# Patient Record
Sex: FEMALE | Race: WHITE | NOT HISPANIC OR LATINO | Employment: STUDENT | URBAN - METROPOLITAN AREA
[De-identification: names, ages, dates, MRNs, and addresses within clinical notes are randomized per-mention and may not be internally consistent; named-entity substitution may affect disease eponyms.]

---
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
## 2019-08-27 ENCOUNTER — TRANSCRIBE ORDERS (OUTPATIENT)
Dept: PHYSICAL THERAPY | Facility: CLINIC | Age: 19
End: 2019-08-27

## 2019-08-27 ENCOUNTER — EVALUATION (OUTPATIENT)
Dept: PHYSICAL THERAPY | Facility: CLINIC | Age: 19
End: 2019-08-27
Payer: COMMERCIAL

## 2019-08-27 DIAGNOSIS — G44.89 OTHER HEADACHE SYNDROME: Primary | ICD-10-CM

## 2019-08-27 DIAGNOSIS — G44.89: Primary | ICD-10-CM

## 2019-08-27 PROCEDURE — 97162 PT EVAL MOD COMPLEX 30 MIN: CPT | Performed by: PHYSICAL MEDICINE & REHABILITATION

## 2019-08-27 NOTE — PROGRESS NOTES
PT Evaluation     Today's date: 2019  Patient name: Mei Arredondo  : 2000  MRN: [id number]  Referring provider: Mira Dawkins  Dx:   Encounter Diagnosis     ICD-10-CM    1  Other headache syndrome G44 89        Start Time: 1545  Stop Time: 1645  Total time in clinic (min): 60 minutes    Assessment  Assessment details: Pt is a 20 y o  female presenting to outpatient physical therapy with neck pain and cervicogenic headaches   Pt presents with pain, decreased range of motion, decreased strength, and decreased tolerance to activity  Pt would benefit from skilled physical therapy to address limitations and to achieve goals  Thank you for this referral    Impairments: abnormal muscle tone, abnormal or restricted ROM, impaired physical strength, lacks appropriate home exercise program, pain with function and poor posture     Symptom irritability: highUnderstanding of Dx/Px/POC: good   Prognosis: good    Goals  ST  Patient will report 25% decrease in pain in 4 weeks  2  Patient will demonstrate 25% improvement in ROM in 4 weeks  3  Patient will demonstrate 1/2 grade improvement in strength in 4 weeks  LT  Patient will be able to perform IADLS without restriction or pain by discharge  2  Patient will be independent in HEP by discharge  3  Patient will be able to return to recreational/work duties without restriction or pain by discharge        Plan  Patient would benefit from: PT eval and skilled physical therapy  Planned modality interventions: biofeedback, cryotherapy, TENS and thermotherapy: hydrocollator packs  Planned therapy interventions: joint mobilization, manual therapy, neuromuscular re-education, patient education, postural training, strengthening, stretching, therapeutic activities, therapeutic exercise, therapeutic training, functional ROM exercises, abdominal trunk stabilization and home exercise program  Frequency: 2x week  Duration in weeks: 4  Treatment plan discussed with: patient        Subjective Evaluation    History of Present Illness  Mechanism of injury: Pt reports history of 3 documented concussions, most recently 19  She was seen by neurology and diagnosed with cervicogenic headaches  She no longer experiences the headache sxs, however she reports neck and upper/mid/lower thoracic pain  She was seen twice by a chiropractor with temporary relief  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[employer]  Pain  Current pain ratin  At best pain ratin  At worst pain ratin  Quality: tight and pressure    Patient Goals  Patient goals for therapy: decreased pain  Patient goal: Ability to maintain upright seated posture  Objective     Static Posture     Head  Forward  Shoulders  Rounded  Scapulae  Left anteriorly tipped and right anteriorly tipped  Thoracic Spine  Hyperkyphosis      Active Range of Motion   Cervical/Thoracic Spine       Cervical    Flexion: 60 degrees   Extension: 65 degrees      Left lateral flexion: 45 degrees      Right lateral flexion: 50 degrees      Left rotation: 65 degrees  Right rotation: 65 degrees         Left Shoulder   Normal active range of motion    Right Shoulder   Normal active range of motion    Additional Active Range of Motion Details  Aberrant motion with flexion, R SB,     Joint Play     Hypomobile: C3, C4, C5, C6, C7, T1, T2, T3, T4, T5, T6, T7, T8, T9, T10, T11 and T12     Strength/Myotome Testing     Left Shoulder     Planes of Motion   Flexion: 4   Extension: 4   Abduction: 4     Right Shoulder     Planes of Motion   Flexion: 4   Extension: 4   Abduction: 4     Tests   Cervical   Positive neck flexor muscle endurance test     Additional Tests Details  Chin tuck and lift: 6 seconds      Flowsheet Rows      Most Recent Value   PT/OT G-Codes   Current Score  59   Projected Score  75             Precautions: Asthma, migraines      Manual              T/S PA (mid/low) Gr V  NC            Lumbar rotational mob Gr V  NC Exercise Diary              UBE             Open books             TB rows             TB ext             Doorway stretch             Prone YTW             Alt UE/LUIS MANUEL hernandez biofeedback                                                                                                                                                                Modalities

## 2019-08-27 NOTE — LETTER
2019    [place] [address]    Patient: Stephany Colindres   YOB: 2000   Date of Visit: 2019     Encounter Diagnosis     ICD-10-CM    1  Other headache syndrome G44 89        Dear Dr Carey Chol:    Thank you for your recent referral of Stephany Colindres  Please review the attached evaluation summary from Tika's recent visit  Please verify that you agree with the plan of care by signing the attached order  If you have any questions or concerns, please do not hesitate to call  I sincerely appreciate the opportunity to share in the care of one of your patients and hope to have another opportunity to work with you in the near future  Sincerely,    Shaggy Leong, PT      Referring Provider:      I certify that I have read the below Plan of Care and certify the need for these services furnished under this plan of treatment while under my care  Augusto [address]: [phone]          PT Evaluation     Today's date: 2019  Patient name: Stephany Colindres  : 2000  MRN: [id number]  Referring provider: Ming Fernandez  Dx:   Encounter Diagnosis     ICD-10-CM    1  Other headache syndrome G44 89        Start Time: 1545  Stop Time: 1645  Total time in clinic (min): 60 minutes    Assessment  Assessment details: Pt is a 20 y o  female presenting to outpatient physical therapy with neck pain and cervicogenic headaches   Pt presents with pain, decreased range of motion, decreased strength, and decreased tolerance to activity  Pt would benefit from skilled physical therapy to address limitations and to achieve goals   Thank you for this referral    Impairments: abnormal muscle tone, abnormal or restricted ROM, impaired physical strength, lacks appropriate home exercise program, pain with function and poor posture     Symptom irritability: highUnderstanding of Dx/Px/POC: good Prognosis: good    Goals  ST  Patient will report 25% decrease in pain in 4 weeks  2  Patient will demonstrate 25% improvement in ROM in 4 weeks  3  Patient will demonstrate 1/2 grade improvement in strength in 4 weeks  LT  Patient will be able to perform IADLS without restriction or pain by discharge  2  Patient will be independent in HEP by discharge  3  Patient will be able to return to recreational/work duties without restriction or pain by discharge  Plan  Patient would benefit from: PT eval and skilled physical therapy  Planned modality interventions: biofeedback, cryotherapy, TENS and thermotherapy: hydrocollator packs  Planned therapy interventions: joint mobilization, manual therapy, neuromuscular re-education, patient education, postural training, strengthening, stretching, therapeutic activities, therapeutic exercise, therapeutic training, functional ROM exercises, abdominal trunk stabilization and home exercise program  Frequency: 2x week  Duration in weeks: 4  Treatment plan discussed with: patient        Subjective Evaluation    History of Present Illness  Mechanism of injury: Pt reports history of 3 documented concussions, most recently 19  She was seen by neurology and diagnosed with cervicogenic headaches  She no longer experiences the headache sxs, however she reports neck and upper/mid/lower thoracic pain  She was seen twice by a chiropractor with temporary relief  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[employer]  Pain  Current pain ratin  At best pain ratin  At worst pain ratin  Quality: tight and pressure    Patient Goals  Patient goals for therapy: decreased pain  Patient goal: Ability to maintain upright seated posture  Objective     Static Posture     Head  Forward  Shoulders  Rounded  Scapulae  Left anteriorly tipped and right anteriorly tipped  Thoracic Spine  Hyperkyphosis      Active Range of Motion   Cervical/Thoracic Spine Cervical    Flexion: 60 degrees   Extension: 65 degrees      Left lateral flexion: 45 degrees      Right lateral flexion: 50 degrees      Left rotation: 65 degrees  Right rotation: 65 degrees         Left Shoulder   Normal active range of motion    Right Shoulder   Normal active range of motion    Additional Active Range of Motion Details  Aberrant motion with flexion, R SB,     Joint Play     Hypomobile: C3, C4, C5, C6, C7, T1, T2, T3, T4, T5, T6, T7, T8, T9, T10, T11 and T12     Strength/Myotome Testing     Left Shoulder     Planes of Motion   Flexion: 4   Extension: 4   Abduction: 4     Right Shoulder     Planes of Motion   Flexion: 4   Extension: 4   Abduction: 4     Tests   Cervical   Positive neck flexor muscle endurance test     Additional Tests Details  Chin tuck and lift: 6 seconds      Flowsheet Rows      Most Recent Value   PT/OT G-Codes   Current Score  59   Projected Score  75             Precautions: Asthma, migraines      Manual  8/27            T/S PA (mid/low) Gr V  NC            Lumbar rotational mob Gr V  NC                                                       Exercise Diary              UBE             Open books             TB rows             TB ext             Doorway stretch             Prone YTW             Alt UE/LE             superman             Chin tucks c biofeedback                                                                                                                                                                Modalities

## 2019-08-29 ENCOUNTER — OFFICE VISIT (OUTPATIENT)
Dept: PHYSICAL THERAPY | Facility: CLINIC | Age: 19
End: 2019-08-29
Payer: COMMERCIAL

## 2019-08-29 DIAGNOSIS — G44.89 OTHER HEADACHE SYNDROME: Primary | ICD-10-CM

## 2019-08-29 PROCEDURE — 97110 THERAPEUTIC EXERCISES: CPT

## 2019-08-29 PROCEDURE — 97140 MANUAL THERAPY 1/> REGIONS: CPT | Performed by: PHYSICAL MEDICINE & REHABILITATION

## 2019-08-29 NOTE — PROGRESS NOTES
Daily Note     Today's date: 2019  Patient name: Jun Banerjee  : 2000  MRN: [id number]  Referring provider: Pippa Adkins, PT  Dx:   Encounter Diagnosis     ICD-10-CM    1  Other headache syndrome G44 89                   Subjective: Pt states that she is feeling better, she has been attempting to get up every 45 minutes with improved sxs  Objective: See treatment diary below     Precautions: Asthma, migraines    Manual             T/S PA (mid/low) Gr V  NC Gr V  NC           Lumbar rotational mob Gr V  NC Gr V   NC                          Exercise Diary              UBE 3'/3'            Open books nv            TB rows GTB 5"x20            TB ext GTB 5"x20            Doorway stretch High and low 30"x3 ea            Prone YTW 5"x15 ea            Alt UE/LE 5"x10 ea            superman 5"x20            Chin tucks c biofeedback 4 diff [id number]"x10 ea            Multifidus press GTB 5"x13 ea                                                       Assessment: Tolerated treatment well  Patient demonstrated fatigue post treatment, exhibited good technique with therapeutic exercises and would benefit from continued PT to address remaining strength and stability deficits to improve posture and reduce HA frequency  Plan: Continue per plan of care  Progress treatment as tolerated      Mario Núñez, PTA

## 2019-09-03 ENCOUNTER — OFFICE VISIT (OUTPATIENT)
Dept: PHYSICAL THERAPY | Facility: CLINIC | Age: 19
End: 2019-09-03
Payer: COMMERCIAL

## 2019-09-03 DIAGNOSIS — G44.89 OTHER HEADACHE SYNDROME: Primary | ICD-10-CM

## 2019-09-03 PROCEDURE — 97110 THERAPEUTIC EXERCISES: CPT

## 2019-09-03 PROCEDURE — 97140 MANUAL THERAPY 1/> REGIONS: CPT | Performed by: PHYSICAL THERAPIST

## 2019-09-03 NOTE — PROGRESS NOTES
Daily Note     Today's date: 9/3/2019  Patient name: Mei Arredondo  : 2000  MRN: [id number]  Referring provider: Carter Yadav, PT  Dx:   Encounter Diagnosis     ICD-10-CM    1  Other headache syndrome G44 89        Start Time: 903  Stop Time: 6059  Total time in clinic (min): 52 minutes    Subjective: Pt reports her neck pain has been feeling better  States her last HA was yesterday, but it was not as intense as past HA's  Continues to have HA's 2-3 times a week  Notes she has not been compliant with her HEP but has been moving more and is more aware of her posture while at school  Objective: See treatment diary below      Assessment: Tolerated treatment well  Slight pain noted in upper back/thoracic spine during chin tuck with biofeedback  Pain resolved post manuals and additional exercise  Visible limitations in strength noted during superman, R>L  Occasional verbal cues required for proper form and intensity with assigned exercise throughout session  Slight "pins ad needles" reported down BUE during high doorway stretch that began to resolve by end of treatment  Patient would benefit from continued PT to further decrease frequency of HA's and improve strength/stability in effort to improve posture  Plan: Continue per plan of care  Progress as able       Precautions: Asthma, migraines    Manual  8/27 8/29 9/3          T/S PA (mid/low) Gr V  NC Gr V  NC Gr V  MILENA          Lumbar rotational mob Gr V  NC Gr V   NC Gr V  MILENA                         Exercise Diary  8/29 9/3           UBE 3'/3' 3'/3'           Open books nv 10"x10 ea           TB rows GTB 5"x20 GTB 5"x20           TB ext GTB 5"x20 GTB 5"x20           Doorway stretch High and low 30"x3 ea High and low 30"x3 ea           Prone YTW 5"x15 ea 5"x15 ea           Alt UE/LE 5"x10 ea 5"x10 ea           superman 5"x20 5"x20           Chin tucks c biofeedback 4 diff [id number]"x10 ea 2 diff  pressure  10"x10 ea           Multifidus press GTB 5"x13 ea GTB 5"x15 ea

## 2019-09-05 ENCOUNTER — OFFICE VISIT (OUTPATIENT)
Dept: PHYSICAL THERAPY | Facility: CLINIC | Age: 19
End: 2019-09-05
Payer: COMMERCIAL

## 2019-09-05 DIAGNOSIS — G44.89 OTHER HEADACHE SYNDROME: Primary | ICD-10-CM

## 2019-09-05 PROCEDURE — 97140 MANUAL THERAPY 1/> REGIONS: CPT | Performed by: PHYSICAL MEDICINE & REHABILITATION

## 2019-09-05 PROCEDURE — 97110 THERAPEUTIC EXERCISES: CPT | Performed by: PHYSICAL MEDICINE & REHABILITATION

## 2019-09-05 NOTE — PROGRESS NOTES
Daily Note     Today's date: 2019  Patient name: Mary Ge  : 2000  MRN: [id number]  Referring provider: Danna Aguirre, PT  Dx:   Encounter Diagnosis     ICD-10-CM    1  Other headache syndrome G44 89        Start Time: 0900  Stop Time: 0945  Total time in clinic (min): 45 minutes    Subjective: Pt states that that she has been experiencing muscular soreness since last tx  Overall she is feeling better  Objective: See treatment diary below      Assessment: Tolerated treatment well  Patient demonstrated fatigue post treatment, exhibited good technique with therapeutic exercises, would benefit from continued PT and deficits in R side NM control with lumbar stabilization isolation  Plan: Continue per plan of care        Precautions: Asthma, migraines    Manual  8/27 8/29 9/3 9/5         T/S PA (mid/low) Gr V  NC Gr V  NC Gr V  MILENA Gr V  NC         Lumbar rotational mob Gr V  NC Gr V   NC Gr V  MILENA Gr V  NC         STM lumbar    NC           Exercise Diary  8/29 9/3 9/5          Bike   8'          UBE 3'/3' 3'/3'           Open books nv 10"x10 ea           TB rows GTB 5"x20 GTB 5"x20 GTB  20 x 5"          TB ext GTB 5"x20 GTB 5"x20 GTB  5" x 20          Doorway stretch High and low 30"x3 ea High and low 30"x3 ea           Prone YTW 5"x15 ea 5"x15 ea           Alt UE/LE 5"x10 ea 5"x10 ea 5" x 15 ea          superman 5"x20 5"x20           Chin tucks c biofeedback 4 diff [id number]"x10 ea 2 diff  pressure  10"x10 ea           Multifidus press GTB 5"x13 ea GTB 5"x15 ea BTB  5" x 15

## 2019-09-10 ENCOUNTER — OFFICE VISIT (OUTPATIENT)
Dept: PHYSICAL THERAPY | Facility: CLINIC | Age: 19
End: 2019-09-10
Payer: COMMERCIAL

## 2019-09-10 DIAGNOSIS — G44.89 OTHER HEADACHE SYNDROME: Primary | ICD-10-CM

## 2019-09-10 PROCEDURE — 97110 THERAPEUTIC EXERCISES: CPT

## 2019-09-10 NOTE — PROGRESS NOTES
Daily Note     Today's date: 9/10/2019  Patient name: Carroll Sanderson  : 2000  MRN: [id number]  Referring provider: Vicenta Rosario, PT  Dx:   Encounter Diagnosis     ICD-10-CM    1  Other headache syndrome G44 89      Start Time: 0850  Stop Time: 0950  Total time in clinic (min): 60 minutes  Subjective: Pt reports experiencing H/A Sx for the last 2 days, but arrives to today's treatment feeling "okay"  Objective: See treatment diary below    Assessment: Pt reported increased H/A Sx and slight dizziness with added weight during prone YTWs and when changing from prone to standing positiong  Pt demonstrated fatigue post table TE program with c/o of cervical muscle fatigue and pain  - PT NC provided manual treatment to help reduce Sx  Consider restructuring treatment to follow warm-up activity with standing TB exercises followed by table TE program to address fatigue  Pt required verbal and tactile cues throughout TE program for proper muscle recruitment and technique  Pt would benefit from continued PT to improve lumbar stability, reduce HA Sx, and improve posture/mobility  Plan: Continue per plan of care  Provide pt with TB and updated HEP NV       Precautions: Asthma, migraines  Manual  8/27 8/29 9/3 9/5 9/10        T/S PA (mid/low) Gr V  NC Gr V  NC Gr V  MILENA Gr V  NC Gr V  NC        Lumbar rotational mob Gr V  NC Gr V   NC Gr V  MILENA Gr V  NC Gr V  NC        STM lumbar    NC  --        (R) UT/Levator stretch/STM     NC        SOR     NC          Exercise Diary  8/29 9/3 9/5 9/10         Bike   8' 6'         UBE 3'/3' 3'/3'  3'/3'         Open books nv 10"x10 ea           TB rows GTB 5"x20 GTB 5"x20 GTB  20 x 5"          TB ext GTB 5"x20 GTB 5"x20 GTB  5" x 20          Multifidus press GTB  5"x13ea GTB  5"x15ea BTB  5"x15          B/L ER              TB scap retract - ER - punch    YTB  5x         Doorway stretch High and low 30"x3 ea High and low 30"x3 ea           Prone YTW 5"x15 ea 5"x15 ea  T: 5"x10x, 1#5"x5  Y 1# 5"x15  W 5"x15x         Alt UE/LE 5"x10 ea 5"x10 ea 5" x 15 ea 5"x15ea         superman 5"x20 5"x20  5"x20         Supine Pball squeeze    NV         Chin tucks c biofeedback 4 diff [id number]"x10 ea 2 diff  pressure  10"x10 ea                                                                   Santi Diaz, PTA

## 2019-09-12 ENCOUNTER — OFFICE VISIT (OUTPATIENT)
Dept: PHYSICAL THERAPY | Facility: CLINIC | Age: 19
End: 2019-09-12
Payer: COMMERCIAL

## 2019-09-12 DIAGNOSIS — G44.89 OTHER HEADACHE SYNDROME: Primary | ICD-10-CM

## 2019-09-12 PROCEDURE — 97110 THERAPEUTIC EXERCISES: CPT | Performed by: PHYSICAL MEDICINE & REHABILITATION

## 2019-09-12 NOTE — PROGRESS NOTES
Daily Note     Today's date: 2019  Patient name: Maikel Travis  : 2000  MRN: [id number]  Referring provider: Shaneka Wyatt, PT  Dx:   Encounter Diagnosis     ICD-10-CM    1  Other headache syndrome G44 89        Start Time: 0900  Stop Time: 0953  Total time in clinic (min): 53 minutes    Subjective: Pt states that she is feeling good, no adverse sxs since last tx  Objective: See treatment diary below      Assessment: Tolerated treatment well  Patient demonstrated fatigue post treatment, exhibited good technique with therapeutic exercises, would benefit from continued PT and tolerated additions and progressions to tx without adverse sxs  Plan: Consider D/C next visit, provide HEP for continued postural strength/endurance and spinal stabilization        Precautions: Asthma, migraines  Manual  8/27 8/29 9/3 9/5 9/10        T/S PA (mid/low) Gr V  NC Gr V  NC Gr V  MILENA Gr V  NC Gr V  NC        Lumbar rotational mob Gr V  NC Gr V   NC Gr V  MILENA Gr V  NC Gr V  NC        STM lumbar    NC  --        (R) UT/Levator stretch/STM     NC        SOR     NC          Exercise Diary  8/29 9/3 9/5 9/10 9/12        Bike   8' 6'         UBE 3'/3' 3'/3'  3'/3' 6'30"  >50 RPM  Lv 2 3        Open books nv 10"x10 ea           TB rows GTB 5"x20 GTB 5"x20 GTB  20 x 5"  BTB  20 c 5" hold        TB ext GTB 5"x20 GTB 5"x20 GTB  5" x 20          Multifidus press GTB  5"x13ea GTB  5"x15ea BTB  5"x15          B/L ER              TB scap retract - ER - punch    YTB  5x         Foam roller     5'        Doorway stretch High and low 30"x3 ea High and low 30"x3 ea   HEP        Prone YTW 5"x15 ea 5"x15 ea  T: 5"x10x, 1#5"x5  Y 1# 5"x15  W 5"x15x standing T c YTB  15        Alt UE/LE 5"x10 ea 5"x10 ea 5" x 15 ea 5"x15ea birdog  10 ea c 5" hold        superman 5"x20 5"x20  5"x20 15  c 5" hold        Supine Pball squeeze    NV         Chin tucks c biofeedback 4 diff [id number]"x10 ea 2 diff  pressure  10"x10 ea           Field goals TB     YTB  10

## 2019-09-17 ENCOUNTER — OFFICE VISIT (OUTPATIENT)
Dept: PHYSICAL THERAPY | Facility: CLINIC | Age: 19
End: 2019-09-17
Payer: COMMERCIAL

## 2019-09-17 DIAGNOSIS — G44.89 OTHER HEADACHE SYNDROME: Primary | ICD-10-CM

## 2019-09-17 PROCEDURE — 97110 THERAPEUTIC EXERCISES: CPT

## 2019-09-17 PROCEDURE — 97110 THERAPEUTIC EXERCISES: CPT | Performed by: PHYSICAL MEDICINE & REHABILITATION

## 2019-09-17 NOTE — PROGRESS NOTES
Daily Note     Today's date: 2019  Patient name: Rhys Mcgowan  : 2000  MRN: [id number]  Referring provider: Lashae Begum, PT  Dx:   Encounter Diagnosis     ICD-10-CM    1  Other headache syndrome G44 89                   Subjective: Pt reports that she is ready to be discharged today but has had headaches everyday for the past week  She notes reproduction of HA symptoms with palpation to R side of occiput  She denies taking meds to treat HA and notes decrease in HA post-manuals  Objective: See treatment diary below      Precautions: Asthma, migraines  Manual  8/29 9/3 9/5 9/10 9/12 9/17      T/S PA (mid/low) Gr V  NC Gr V  MILENA Gr V  NC Gr V  NC  Gr V  NC      Lumbar rotational mob Gr V   NC Gr V  MILENA Gr V  NC Gr V  NC        STM lumbar   NC  --        (R) UT/Levator stretch/STM    NC        SOR    NC  NC      Cervical upslides      NC        Exercise Diary  8/29 9/3 9/5 9/10 9/12 9/17      Bike   8' 6'  -      UBE 3'/3' 3'/3'  3'/3' 6'30"  >50 RPM  Lv 2 3 6'  >50 RPM  L 2 5      Open books nv 10"x10 ea    -      TB rows GTB 5"x20 GTB 5"x20 GTB  20 x 5"  BTB  20 c 5" hold BTB 5"x20      TB ext GTB 5"x20 GTB 5"x20 GTB  5" x 20   -      Multifidus press GTB  5"x13ea GTB  5"x15ea BTB  5"x15   -      B/L ER       -      Foam roller     5' -      Doorway stretch High and low 30"x3 ea High and low 30"x3 ea   HEP -      Prone YTW 5"x15 ea 5"x15 ea  T: 5"x10x, 1#5"x5  Y 1# 5"x15  W 5"x15x standing T c YTB  15 1#, 5"x15 ea b/l      Alt UE/LE 5"x10 ea 5"x10 ea 5" x 15 ea 5"x15ea birdog  10 ea c 5" hold quadruped 5"x10 ea      superman 5"x20 5"x20  5"x20 15  c 5" hold 5"x15      Supine Pball squeeze    NV  -      Chin tucks c biofeedback 4 diff [id number]"x10 ea 2 diff  pressure  10"x10 ea    -      Field goals TB    PTB 5x PTB 10x PTB 10x                                              Assessment: Tolerated treatment well   Patient demonstrated fatigue post treatment and exhibited good technique with therapeutic exercises  Plan: D/C to HEP  Issued and educated in updated HEP      Mai Thapa, PTA

## 2019-09-19 ENCOUNTER — APPOINTMENT (OUTPATIENT)
Dept: PHYSICAL THERAPY | Facility: CLINIC | Age: 19
End: 2019-09-19
Payer: COMMERCIAL

## 2019-09-24 ENCOUNTER — APPOINTMENT (OUTPATIENT)
Dept: PHYSICAL THERAPY | Facility: CLINIC | Age: 19
End: 2019-09-24
Payer: COMMERCIAL

## 2019-09-26 ENCOUNTER — APPOINTMENT (OUTPATIENT)
Dept: PHYSICAL THERAPY | Facility: CLINIC | Age: 19
End: 2019-09-26
Payer: COMMERCIAL

## 2021-03-30 DIAGNOSIS — Z23 ENCOUNTER FOR IMMUNIZATION: ICD-10-CM
